# Patient Record
Sex: MALE | Race: WHITE | NOT HISPANIC OR LATINO | Employment: FULL TIME | URBAN - METROPOLITAN AREA
[De-identification: names, ages, dates, MRNs, and addresses within clinical notes are randomized per-mention and may not be internally consistent; named-entity substitution may affect disease eponyms.]

---

## 2024-06-04 ENCOUNTER — HOSPITAL ENCOUNTER (EMERGENCY)
Facility: CLINIC | Age: 24
Discharge: HOME | End: 2024-06-04
Attending: STUDENT IN AN ORGANIZED HEALTH CARE EDUCATION/TRAINING PROGRAM | Admitting: STUDENT IN AN ORGANIZED HEALTH CARE EDUCATION/TRAINING PROGRAM
Payer: OTHER MISCELLANEOUS

## 2024-06-04 VITALS
SYSTOLIC BLOOD PRESSURE: 140 MMHG | HEART RATE: 76 BPM | TEMPERATURE: 97.8 F | DIASTOLIC BLOOD PRESSURE: 76 MMHG | OXYGEN SATURATION: 99 % | RESPIRATION RATE: 12 BRPM

## 2024-06-04 DIAGNOSIS — S61.012A LACERATION OF LEFT THUMB WITHOUT FOREIGN BODY WITHOUT DAMAGE TO NAIL, INITIAL ENCOUNTER: ICD-10-CM

## 2024-06-04 PROCEDURE — 99283 EMERGENCY DEPT VISIT LOW MDM: CPT

## 2024-06-04 PROCEDURE — 250N000013 HC RX MED GY IP 250 OP 250 PS 637: Performed by: STUDENT IN AN ORGANIZED HEALTH CARE EDUCATION/TRAINING PROGRAM

## 2024-06-04 PROCEDURE — 12001 RPR S/N/AX/GEN/TRNK 2.5CM/<: CPT

## 2024-06-04 RX ORDER — ACETAMINOPHEN 325 MG/1
975 TABLET ORAL ONCE
Status: COMPLETED | OUTPATIENT
Start: 2024-06-04 | End: 2024-06-04

## 2024-06-04 RX ORDER — IBUPROFEN 600 MG/1
600 TABLET, FILM COATED ORAL ONCE
Status: COMPLETED | OUTPATIENT
Start: 2024-06-04 | End: 2024-06-04

## 2024-06-04 RX ADMIN — IBUPROFEN 600 MG: 600 TABLET ORAL at 20:35

## 2024-06-04 RX ADMIN — ACETAMINOPHEN 975 MG: 325 TABLET, FILM COATED ORAL at 20:35

## 2024-06-04 ASSESSMENT — ACTIVITIES OF DAILY LIVING (ADL): ADLS_ACUITY_SCORE: 35

## 2024-06-05 NOTE — ED TRIAGE NOTES
Patient cut his left thumb at around 1730 tonight at work, he said that it is very painful and that it bled really badly.  BIC at this time it was wrapped by ERT when he arrived.      Triage Assessment (Adult)       Row Name 06/04/24 1928          Triage Assessment    Airway WDL WDL        Respiratory WDL    Respiratory WDL WDL        Cardiac WDL    Cardiac WDL WDL        Peripheral/Neurovascular WDL    Peripheral Neurovascular WDL WDL

## 2024-06-05 NOTE — ED PROVIDER NOTES
Emergency Department Note      History of Present Illness     Chief Complaint  Laceration    HPI  Lauri Diaz is a 23 year old male who presents at the emergency department for evaluation of a laceration. Lauri reports that earlier this evening he was stocking items at [employer] when he sliced his left thumb with a . The patient states that he is experiencing throbbing pain to the laceration region. Lauri says the pain comes and goes. He notes that he is ambidextrous, but mostly uses his right hand.     Independent Historian  None    Review of External Notes  Reviewed [facility] 2020  Past Medical History   Medical History and Problem List  No past medical history on file.    Medications  No current outpatient medications on file.      Surgical History   No past surgical history on file.  Physical Exam   Patient Vitals for the past 24 hrs:   BP Temp Temp src Pulse Resp SpO2   06/04/24 1927 (!) 140/76 97.8  F (36.6  C) Temporal 76 12 99 %     Physical Exam  General: Alert and cooperative with exam. Patient in no apparent distress. Normal mentation.  Head:  Scalp is NC/AT  Eyes:  EOM intact  ENT:  The external nose and ears are normal.   Neck:  Normal range of motion without rigidity.  CV:  Warm and well perfused  Resp:  Breathing comfortably on room air  MSK:  Full ROM of left thumb without difficulty. Able to fully flex and extend against resistance.   Skin:  1 cm superficial laceration to distal phalanx of left thumb, bleeding controlled  Neuro:  Oriented x 3. No gross motor deficits.    Diagnostics   None  ED Course    Medications Administered  Medications   ibuprofen (ADVIL/MOTRIN) tablet 600 mg (600 mg Oral $Given 6/4/24 2035)   acetaminophen (TYLENOL) tablet 975 mg (975 mg Oral $Given 6/4/24 2035)       Procedures  Procedures     Laceration Repair      Procedure: Laceration Repair    Indication: Laceration    Consent: Verbal    Tetanus status reviewed    Location: Left L first (thumb)  finger    Length: 1 cm    Preparation: Irrigation with Tap Water.    Anesthesia/Sedation: none      Treatment/Exploration: Wound explored, no foreign bodies found     Closure: The wound was closed with Tissue Adhesive.    Patient Status: The patient tolerated the procedure well: Yes. There were no complications.      Discussion of Management  None    Social Determinants of Health adding to complexity of care  None    ED Course  ED Course as of 06/04/24 2039 Tue Jun 04, 2024 2019 I obtained history and examined the patient as noted above     2030 I rechecked the patient and preformed procedure.        Medical Decision Making / Diagnosis   CMS Diagnoses: None    MIPS     None    MDM  Lauri Diaz is a 23 year old male who presents with superficial laceration to distal phalanx of left thumb.  Bleeding controlled.  Patient has full range of motion of the digit without difficulty or pain.  No evidence of tendon involvement.  No foreign body noted within wound and low suspicion for bony involvement given superficial nature.  Laceration was repaired with tissue adhesive, patient tolerated this well.  Discussed wound cares for home and provided wound dressings here.  Discussed reasons to return to ER such as worsening pain or signs or symptoms of infection.  Patient voiced understanding and agreement.  He is up-to-date on tetanus and is safe for discharge home.    Disposition  The patient was discharged.     ICD-10 Codes:    ICD-10-CM    1. Laceration of left thumb without foreign body without damage to nail, initial encounter  S61.012A            Discharge Medications  New Prescriptions    No medications on file         Scribe Disclosure:  I, Rosemary Stewart, am serving as a scribe at 8:24 PM on 6/4/2024 to document services personally performed by Meron Zhong PA-C based on my observations and the provider's statements to me.        Meron Zhong PA-C  06/04/24 2039

## 2024-06-05 NOTE — DISCHARGE INSTRUCTIONS
Continue to keep the wound clean and dry.  Okay to wash with soap and water daily and apply new bandage.  Please do not apply bacitracin or Neosporin as this will breakdown the glue too quickly.  Feel free to add more glue to the area if you feel this is necessary.  If you develop any signs or symptoms of infection including increased redness, swelling, drainage from the wound, return to ER.   yes